# Patient Record
Sex: FEMALE | URBAN - METROPOLITAN AREA
[De-identification: names, ages, dates, MRNs, and addresses within clinical notes are randomized per-mention and may not be internally consistent; named-entity substitution may affect disease eponyms.]

---

## 2021-04-19 ENCOUNTER — OFFICE (OUTPATIENT)
Dept: URBAN - METROPOLITAN AREA CLINIC 79 | Facility: CLINIC | Age: 66
End: 2021-04-19

## 2021-04-19 VITALS
TEMPERATURE: 97 F | SYSTOLIC BLOOD PRESSURE: 100 MMHG | WEIGHT: 186 LBS | HEART RATE: 100 BPM | HEIGHT: 62 IN | DIASTOLIC BLOOD PRESSURE: 73 MMHG

## 2021-04-19 DIAGNOSIS — E11.9 TYPE 2 DIABETES MELLITUS WITHOUT COMPLICATIONS: ICD-10-CM

## 2021-04-19 DIAGNOSIS — I25.10 ATHEROSCLEROTIC HEART DISEASE OF NATIVE CORONARY ARTERY WITH: ICD-10-CM

## 2021-04-19 DIAGNOSIS — E03.9 HYPOTHYROIDISM, UNSPECIFIED: ICD-10-CM

## 2021-04-19 DIAGNOSIS — K59.09 OTHER CONSTIPATION: ICD-10-CM

## 2021-04-19 DIAGNOSIS — Z12.11 ENCOUNTER FOR SCREENING FOR MALIGNANT NEOPLASM OF COLON: ICD-10-CM

## 2021-04-19 PROCEDURE — 99204 OFFICE O/P NEW MOD 45 MIN: CPT | Performed by: PHYSICIAN ASSISTANT

## 2021-04-19 NOTE — SERVICEHPINOTES
BO HUMPHRIES   is a   66   year old   white  female who is being seen in consultation at the request of   VICKEY BAPTISTE   for ov prior to colonoscopy. She informs of mild constipation given BMs qod, BSS type 3-4 predominately with mild straining at times. She recalls constipation has improved with high fiber diet and 3-4x/week 1/2 serving of papaya with 2 pears smoothies.  No fm h/o CRC. No prior colonoscopy. She has hypothyroidism and on medication for this condition since 01/2021. She is followed by cardiologist Dr Avila Estrella due to CAD s/p stents in x 2018 On Eliquis. Per patient last surgery involving CCY required use of Heparin when Eliquis was stopped. Denies chest pain, n/v, abdominal pain, change in BMs, weight loss. No other complaints.  BR

## 2021-05-11 ENCOUNTER — ON CAMPUS - OUTPATIENT (OUTPATIENT)
Dept: URBAN - METROPOLITAN AREA HOSPITAL 65 | Facility: HOSPITAL | Age: 66
End: 2021-05-11
Payer: MEDICAID

## 2021-05-11 DIAGNOSIS — K63.5 POLYP OF COLON: ICD-10-CM

## 2021-05-11 DIAGNOSIS — D12.0 BENIGN NEOPLASM OF CECUM: ICD-10-CM

## 2021-05-11 DIAGNOSIS — Z12.11 ENCOUNTER FOR SCREENING FOR MALIGNANT NEOPLASM OF COLON: ICD-10-CM

## 2021-05-11 DIAGNOSIS — D12.3 BENIGN NEOPLASM OF TRANSVERSE COLON: ICD-10-CM

## 2021-05-11 PROCEDURE — 45380 COLONOSCOPY AND BIOPSY: CPT | Mod: 59 | Performed by: INTERNAL MEDICINE

## 2021-05-11 PROCEDURE — 45385 COLONOSCOPY W/LESION REMOVAL: CPT | Mod: PT | Performed by: INTERNAL MEDICINE

## 2021-07-22 ENCOUNTER — OFFICE (OUTPATIENT)
Dept: URBAN - METROPOLITAN AREA CLINIC 79 | Facility: CLINIC | Age: 66
End: 2021-07-22
Payer: MEDICAID

## 2021-07-22 VITALS
TEMPERATURE: 96.6 F | SYSTOLIC BLOOD PRESSURE: 129 MMHG | WEIGHT: 188 LBS | DIASTOLIC BLOOD PRESSURE: 90 MMHG | HEART RATE: 95 BPM | HEIGHT: 62 IN

## 2021-07-22 DIAGNOSIS — D50.9 IRON DEFICIENCY ANEMIA, UNSPECIFIED: ICD-10-CM

## 2021-07-22 DIAGNOSIS — Z86.010 PERSONAL HISTORY OF COLONIC POLYPS: ICD-10-CM

## 2021-07-22 DIAGNOSIS — K59.09 OTHER CONSTIPATION: ICD-10-CM

## 2021-07-22 LAB
HEMATOCRIT: 35.4 % (ref 34–46.6)
HEMOGLOBIN: 10.7 G/DL — LOW (ref 11.1–15.9)
IRON: 153 UG/DL — HIGH (ref 27–139)

## 2021-07-22 PROCEDURE — 99214 OFFICE O/P EST MOD 30 MIN: CPT | Performed by: PHYSICIAN ASSISTANT

## 2021-07-22 RX ORDER — POLYETHYLENE GLYCOL 3350 17 G/17G
POWDER, FOR SOLUTION ORAL
Qty: 1 | Refills: 0 | Status: ACTIVE
Start: 2021-07-22

## 2021-07-22 NOTE — SERVICEHPINOTES
BO HUMPHRIES   is a   67 yo white  female who is here for f/u to colonoscopy. Her colonoscopy removed benign TA polyps RC 5 yrs. She also has DARIUS. Reviewed labs from 05/19/2021 hgb 8/hct 25, mcv 84 prior to start of Vitron C iron. Most recent labs from 06/03/2021 hgb 8.5/hct 28, MCV 86. Per patient she has h/o severe nose bleeds with recent ENT visit on 07/2021 with cauterization of nasal vessel. BRDenies chest pain, n/v, heartburn, abdominal pain, change in BMs, melena, BRBPR, weight loss. No other complaints. She is followed by cardiologist Dr Avila Estrella due to CAD s/p stents in x 2018 On Eliis. BR